# Patient Record
Sex: FEMALE | Race: WHITE
[De-identification: names, ages, dates, MRNs, and addresses within clinical notes are randomized per-mention and may not be internally consistent; named-entity substitution may affect disease eponyms.]

---

## 2017-06-17 ENCOUNTER — HOSPITAL ENCOUNTER (EMERGENCY)
Dept: HOSPITAL 62 - ER | Age: 37
LOS: 1 days | Discharge: HOME | End: 2017-06-18
Payer: OTHER GOVERNMENT

## 2017-06-17 DIAGNOSIS — S43.401A: ICD-10-CM

## 2017-06-17 DIAGNOSIS — R56.9: ICD-10-CM

## 2017-06-17 DIAGNOSIS — S02.5XXB: ICD-10-CM

## 2017-06-17 DIAGNOSIS — S09.90XA: ICD-10-CM
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
## 2017-06-17 DIAGNOSIS — S00.03XA: Primary | ICD-10-CM

## 2017-06-17 LAB
ALBUMIN SERPL-MCNC: 4.3 G/DL (ref 3.5–5)
ALP SERPL-CCNC: 81 U/L (ref 38–126)
ALT SERPL-CCNC: 31 U/L (ref 9–52)
ANION GAP SERPL CALC-SCNC: 12 MMOL/L (ref 5–19)
AST SERPL-CCNC: 26 U/L (ref 14–36)
BASOPHILS # BLD AUTO: 0.1 10^3/UL (ref 0–0.2)
BASOPHILS NFR BLD AUTO: 0.7 % (ref 0–2)
BILIRUB DIRECT SERPL-MCNC: 0.3 MG/DL (ref 0–0.4)
BILIRUB SERPL-MCNC: 0.4 MG/DL (ref 0.2–1.3)
BUN SERPL-MCNC: 16 MG/DL (ref 7–20)
CALCIUM: 8.8 MG/DL (ref 8.4–10.2)
CHLORIDE SERPL-SCNC: 103 MMOL/L (ref 98–107)
CO2 SERPL-SCNC: 25 MMOL/L (ref 22–30)
CREAT SERPL-MCNC: 0.86 MG/DL (ref 0.52–1.25)
EOSINOPHIL # BLD AUTO: 0.5 10^3/UL (ref 0–0.6)
EOSINOPHIL NFR BLD AUTO: 3 % (ref 0–6)
ERYTHROCYTE [DISTWIDTH] IN BLOOD BY AUTOMATED COUNT: 12.6 % (ref 11.5–14)
GLUCOSE SERPL-MCNC: 112 MG/DL (ref 75–110)
HCT VFR BLD CALC: 46.2 % (ref 36–47)
HGB BLD-MCNC: 14.9 G/DL (ref 12–15.5)
HGB HCT DIFFERENCE: -1.5
LYMPHOCYTES # BLD AUTO: 1.8 10^3/UL (ref 0.5–4.7)
LYMPHOCYTES NFR BLD AUTO: 11.2 % (ref 13–45)
MAGNESIUM SERPL-MCNC: 2.2 MG/DL (ref 1.6–2.3)
MCH RBC QN AUTO: 29 PG (ref 27–33.4)
MCHC RBC AUTO-ENTMCNC: 32.3 G/DL (ref 32–36)
MCV RBC AUTO: 90 FL (ref 80–97)
MONOCYTES # BLD AUTO: 0.7 10^3/UL (ref 0.1–1.4)
MONOCYTES NFR BLD AUTO: 4.3 % (ref 3–13)
NEUTROPHILS # BLD AUTO: 12.7 10^3/UL (ref 1.7–8.2)
NEUTS SEG NFR BLD AUTO: 80.8 % (ref 42–78)
POTASSIUM SERPL-SCNC: 4.1 MMOL/L (ref 3.6–5)
PROT SERPL-MCNC: 7.6 G/DL (ref 6.3–8.2)
RBC # BLD AUTO: 5.14 10^6/UL (ref 3.72–5.28)
SODIUM SERPL-SCNC: 140.1 MMOL/L (ref 137–145)
WBC # BLD AUTO: 15.7 10^3/UL (ref 4–10.5)

## 2017-06-17 PROCEDURE — 80307 DRUG TEST PRSMV CHEM ANLYZR: CPT

## 2017-06-17 PROCEDURE — 96375 TX/PRO/DX INJ NEW DRUG ADDON: CPT

## 2017-06-17 PROCEDURE — 87086 URINE CULTURE/COLONY COUNT: CPT

## 2017-06-17 PROCEDURE — 83735 ASSAY OF MAGNESIUM: CPT

## 2017-06-17 PROCEDURE — 81025 URINE PREGNANCY TEST: CPT

## 2017-06-17 PROCEDURE — 96374 THER/PROPH/DIAG INJ IV PUSH: CPT

## 2017-06-17 PROCEDURE — 36415 COLL VENOUS BLD VENIPUNCTURE: CPT

## 2017-06-17 PROCEDURE — 85025 COMPLETE CBC W/AUTO DIFF WBC: CPT

## 2017-06-17 PROCEDURE — 72125 CT NECK SPINE W/O DYE: CPT

## 2017-06-17 PROCEDURE — 80053 COMPREHEN METABOLIC PANEL: CPT

## 2017-06-17 PROCEDURE — 99285 EMERGENCY DEPT VISIT HI MDM: CPT

## 2017-06-17 PROCEDURE — 70450 CT HEAD/BRAIN W/O DYE: CPT

## 2017-06-17 PROCEDURE — 73030 X-RAY EXAM OF SHOULDER: CPT

## 2017-06-17 PROCEDURE — 81001 URINALYSIS AUTO W/SCOPE: CPT

## 2017-06-17 NOTE — RADIOLOGY REPORT (SQ)
EXAM DESCRIPTION:  SHOULDER RIGHT 2 OR MORE VIEWS



COMPLETED DATE/TIME:  6/17/2017 10:38 pm



REASON FOR STUDY:  fall with R lateral shoulder pain



COMPARISON:  None.



NUMBER OF VIEWS:  Three views.



TECHNIQUE:  Internal rotation, external rotation, and Y view images acquired of the right shoulder.



LIMITATIONS:  None.



FINDINGS:  MINERALIZATION: Normal.

BONES: No acute fracture or dislocation.  No worrisome bone lesions.

JOINTS: No dislocation.

VISUALIZED LUNGS AND RIBS: No pneumothorax.  No rib fracture.

SOFT TISSUES: No radiopaque foreign body.

OTHER: No other significant finding.



IMPRESSION:  NEGATIVE STUDY OF THE RIGHT SHOULDER. NO RADIOGRAPHIC EVIDENCE OF ACUTE INJURY.



TECHNICAL DOCUMENTATION:  JOB ID:  [id number]

 2011 [employer]- All Rights Reserved

## 2017-06-17 NOTE — ER DOCUMENT REPORT
ED General





- General


Chief Complaint: Head Injury


Stated Complaint: POSSIBLE SEIZURE


Time Seen by Provider: 06/17/17 22:04


Mode of Arrival: Medic


Information source: Patient





- HPI


Notes: 


Patient is a pleasant 37-year-old female history of seizures presents emergency 

department with report that she was at a local grocery store and had a 

witnessed seizure with tonic-clonic seizure activity and was postictal when EMS 

picked her up.  The patient chipped her left front tooth #9 and struck her 

right side of the head and presents with neck pain and right shoulder pain.  

She denies any medication noncompliance.  She does not drink alcohol.  She 

reports no sleep deprivation or other precipitants to this seizure event.


 


medications:  zoloft, zonegran, lamictal





Patient's last seizure was March 2014.  Patient does drive, and she was advised 

she could no longer drive until she was cleared by her neurologist.














Past Medical History





- General


Information source: Patient





- Social History


Smoking Status: Never Smoker


Smoking Education Provided: No


Frequency of alcohol use: None


Drug Abuse: None


Lives with: Family


Family History: Reviewed & Not Pertinent





Review of Systems





- Review of Systems


Notes: 


REVIEW OF SYSTEMS:


CONSTITUTIONAL :  Denies fever,  chills, or sweats.  Denies recent illness.


EENT:   Denies eye, ear, throat, or mouth pain or symptoms.  Denies nasal or 

sinus congestion or discharge.  Denies throat, tongue, or mouth swelling or 

difficulty swallowing.


CARDIOVASCULAR:  Denies chest pain.  Denies palpitations or racing or irregular 

heart beat.  Denies ankle edema.


RESPIRATORY:  Denies cough, cold, or chest congestion.  Denies shortness of 

breath, difficulty breathing, or wheezing.


GASTROINTESTINAL:  Denies abdominal pain or distention.  Denies vomiting, or 

diarrhea.  Denies blood in vomitus, stools, or per rectum.  Denies black, tarry 

stools.  Denies constipation.  patient reports mild nausea, which is typical 

after A seizure.


GENITOURINARY:  Denies difficulty urinating, painful urination, burning, 

frequency, blood in urine, or discharge.


FEMALE  GENITOURINARY:  Denies vaginal bleeding, heavy or abnormal periods, 

irregular periods.  Denies vaginal discharge or odor. 


MUSCULOSKELETAL:  Denies back  pain.  Pain or stiffness.  Reports right 

shoulder pain


SKIN:   Denies rash, lesions or sores.


HEMATOLOGIC :   Denies easy bruising or bleeding.


LYMPHATIC:  Denies swollen, enlarged glands.


NEUROLOGICAL:  Denies confusion or altered mental status.    Denies dizziness 

or lightheadedness.   Denies weakness or paralysis or loss of use of either 

side.  Denies problems with gait or speech.  Denies sensory loss, numbness, or 

tingling.  Patient reports right sided headache with contusion.  


PSYCHIATRIC:  Denies anxiety or stress.  Denies depression, suicidal ideation, 

or homicidal ideation.





ALL OTHER SYSTEMS REVIEWED AND NEGATIVE.








Dictation was performed using Dragon voice recognition software 





Physical Exam





- Notes


Notes: 


PHYSICAL EXAMINATION:





GENERAL: Well-appearing, well-nourished and in no acute distress.





HEAD: Right forehead/parietal contusion.  No bony deformity or crepitance.





EYES: Pupils equal round and reactive to light, extraocular movements intact, 

conjunctiva are normal.





ENT: Nares patent, oropharynx clear without exudates.  Moist mucous membranes.  

Patient has Thrasher class II dental fracture #9 tooth.  No alveolar ridge 

fracture identified.  Tooth is not significantly loose.  No other oral trauma 

noted.





NECK: supple without lymphadenopathy.  Patient has pain to the right lateral 

neck region.  No crepitance or bony deformity.  Trachea is midline.





LUNGS: Breath sounds clear to auscultation bilaterally and equal.  No wheezes 

rales or rhonchi.





HEART: Regular rate and rhythm without murmurs





ABDOMEN: Soft, nontender, nondistended abdomen.  No guarding, no rebound.  No 

masses appreciated.





Female : deferred





Musculoskeletal: Normal range of motion, no pitting or edema.  No cyanosis.  

Pain over the right lateral shoulder.  No significant pain over the right AC 

joint region.





NEUROLOGICAL: Cranial nerves grossly intact.  Normal speech, normal gait.  

Normal sensory, motor exams.  No cerebellar ataxia.  Patient is alert and 

oriented 3 on my exam.





PSYCH: Normal mood, normal affect.





SKIN: Warm, Dry, normal turgor, no rashes or lesions noted.





Course





- Re-evaluation


Re-evalutation: 


06/17/17 22:31


Patient was given Zofran en route by EMS.  Patient's blood sugar was 96.  After 

arrival, patient was given Tylenol, IV Toradol, IV Reglan for pain and nausea.





Patient had adequate relief of her discomfort after medications.  Patient was 

ambulatory without complaint.  Patient was given penicillin for dental fracture.





No evidence for acute intracranial injury, cervical spine fracture, obvious 

right shoulder fracture, electrolyte imbalance, hypoglycemia, anemia, urinary 

tract infection, pregnancy.





Patient was able to  raise her right arm up against gravity without significant 

difficulty prior to discharge.


06/18/17 00:59








- Laboratory


Result Diagrams: 


 06/17/17 22:47





 06/17/17 22:47


Laboratory results interpreted by me: 


 











  06/17/17 06/17/17 06/17/17





  22:47 22:47 23:45


 


WBC  15.7 H  


 


Seg Neutrophils %  80.8 H  


 


Lymphocytes %  11.2 L  


 


Absolute Neutrophils  12.7 H  


 


Glucose   112 H 


 


Ur Leukocyte Esterase    TRACE H














Discharge





- Discharge


Clinical Impression: 


 Seizure, Thrasher type II





Contusion


Qualifiers:


 Encounter type: initial encounter Contusion area: head Contusion of head detail

: scalp Qualified Code(s): S00.03XA - Contusion of scalp, initial encounter





Head injury


Qualifiers:


 Encounter type: initial encounter Qualified Code(s): S09.90XA - Unspecified 

injury of head, initial encounter





Tooth fracture


Qualifiers:


 Encounter type: initial encounter Fracture type: open Qualified Code(s): 

S02.5XXB - Fracture of tooth (traumatic), initial encounter for open fracture





Shoulder sprain


Qualifiers:


 Encounter type: initial encounter Shoulder sprain type: unspecified sprain 

Laterality: right Qualified Code(s): S43.401A - Unspecified sprain of right 

shoulder joint, initial encounter





Condition: Stable


Disposition: HOME, SELF-CARE


Instructions:  Seizure, Known Epileptic ([facility]), Head Injury Precautions ([facility]), 

Contusion ([facility]), Shoulder Injury ([facility])


Additional Instructions: 


No driving or swimming or operating heavy machinery until you get appropriate 

follow-up and clearance by neurology.





Follow-up with local dentist related to the tooth fracture.





Take ibuprofen as directed for pain.


Prescriptions: 


Amoxicillin 1 tab PO TID #15 tab


Forms:  Return to Work

## 2017-06-17 NOTE — RADIOLOGY REPORT (SQ)
EXAM DESCRIPTION:  CT CERVICAL SPINE WITHOUT



COMPLETED DATE/TIME:  6/17/2017 9:46 pm



REASON FOR STUDY:  INJURY



COMPARISON:  None.



TECHNIQUE:  Axial images acquired through the cervical spine without intravenous contrast.  Images re
viewed with lung, soft tissue and bone windows.  Reconstructed coronal and sagittal MPR images review
ed.  Images stored on PACS.

All CT scanners at this facility use dose modulation, iterative reconstruction, and/or weight based d
osing when appropriate to reduce radiation dose to as low as reasonably achievable (ALARA).

[facility]: Dose Right  [facility]: CareDose    [facility]: Dose Right    [facility]: Teradose 4D    [facility]: Smart [employer]



RADIATION DOSE:  Up-to-date CT equipment and radiation dose reduction techniques were employed. CTDIv
ol: 20.5 mGy. DLP: 412 mGy-cm. mGy.



LIMITATIONS:  None.



FINDINGS:  ALIGNMENT: Anatomic.

MINERALIZATION: Normal.

VERTEBRAL BODIES: No fractures or dislocation.

DISCS: No significant disc disease.

FACETS, LATERAL MASSES, POSTERIOR ELEMENTS: No fractures.  No dislocation.  No acute findings.

HARDWARE: None in the spine.

VISUALIZED RIBS: No fractures.

LUNG APICES AND SOFT TISSUES: No significant or acute findings.

OTHER: No other significant finding.



IMPRESSION:  NO ACUTE OR SIGNIFICANT FINDINGS IN THE CERVICAL SPINE.



TECHNICAL DOCUMENTATION:  JOB ID:  [id number]

Quality ID # 436: Final reports with documentation of one or more dose reduction techniques (e.g., Au
tomated exposure control, adjustment of the mA and/or kV according to patient size, use of iterative 
reconstruction technique)

 2011 [employer]- All Rights Reserved

## 2017-06-17 NOTE — RADIOLOGY REPORT (SQ)
EXAM DESCRIPTION:  CT HEAD WITHOUT



COMPLETED DATE/TIME:  6/17/2017 9:40 pm



REASON FOR STUDY:  head injury, seizure



COMPARISON:  None.



TECHNIQUE:  Axial images acquired through the brain without intravenous contrast.  Images reviewed wi
th bone, brain and subdural windows.  Images stored on PACS.

All CT scanners at this facility use dose modulation, iterative reconstruction, and/or weight based d
osing when appropriate to reduce radiation dose to as low as reasonably achievable (ALARA).

[facility]: Dose Right  [facility]: CareDose    [facility]: Dose Right    [facility]: Teradose 4D    [facility]: [employer]



RADIATION DOSE:  Up-to-date CT equipment and radiation dose reduction techniques were employed. CTDIv
ol: 64.6 mGy. DLP: 1163 mGy-cm. mGy.



LIMITATIONS:  None.



FINDINGS:  VENTRICLES: Normal size and contour.

CEREBRUM: No masses.  No hemorrhage.  No midline shift.  Normal gray/white matter differentiation.  N
o evidence for acute infarction.

CEREBELLUM: No masses.  No hemorrhage.  No alteration of density.  No evidence for acute infarction.

EXTRAAXIAL SPACES: No fluid collections.  No masses.

ORBITS AND GLOBE: No intra- or extraconal masses.  Normal contour of globe without masses.

CALVARIUM: No fracture.

PARANASAL SINUSES: The left maxillary sinus appears diminutive and near complete opacification.  The 
remaining paranasal sinuses are clear and evenly aerated.  The right mastoid air cells are clear scle
rotic.

SOFT TISSUES: No mass or hematoma.

OTHER: No other significant finding.



IMPRESSION:  NORMAL BRAIN CT WITHOUT CONTRAST.  INCIDENTAL NOTE IS MADE OF CHRONIC LEFT MAXILLARY SIN
USITIS.



TECHNICAL DOCUMENTATION:  JOB ID:  [id number]

Quality ID # 436: Final reports with documentation of one or more dose reduction techniques (e.g., Au
tomated exposure control, adjustment of the mA and/or kV according to patient size, use of iterative 
reconstruction technique)

 2011 [employer]- All Rights Reserved

## 2017-06-18 VITALS — SYSTOLIC BLOOD PRESSURE: 120 MMHG | DIASTOLIC BLOOD PRESSURE: 84 MMHG

## 2017-06-18 LAB
APPEARANCE UR: (no result)
BARBITURATES UR QL SCN: NEGATIVE
BILIRUB UR QL STRIP: NEGATIVE
GLUCOSE UR STRIP-MCNC: NEGATIVE MG/DL
KETONES UR STRIP-MCNC: NEGATIVE MG/DL
METHADONE UR QL SCN: NEGATIVE
NITRITE UR QL STRIP: NEGATIVE
PCP UR QL SCN: NEGATIVE
PH UR STRIP: 7 [PH] (ref 5–9)
PROT UR STRIP-MCNC: NEGATIVE MG/DL
SP GR UR STRIP: 1.02
URINE OPIATES LOW: NEGATIVE
UROBILINOGEN UR-MCNC: NEGATIVE MG/DL (ref ?–2)

## 2018-03-20 ENCOUNTER — HOSPITAL ENCOUNTER (OUTPATIENT)
Dept: HOSPITAL 62 - NEURO | Age: 38
End: 2018-03-20
Attending: PSYCHIATRY & NEUROLOGY
Payer: OTHER GOVERNMENT

## 2018-03-20 DIAGNOSIS — G40.909: Primary | ICD-10-CM

## 2018-03-20 PROCEDURE — 95819 EEG AWAKE AND ASLEEP: CPT

## 2018-03-22 NOTE — EEG PRO FEE REPORT
EEG INTERPRETATION



PATIENT NAME: YURI SANCHEZ

MRN: [id number]      ACCT #:  [id number]   ROOM#: 

ORDER#: [id number]

DATE OF STUDY:  2018                   : 1980

REFERRING MD:  KARISHMA MEYERS M.D.

DIAGNOSIS:  Epilepsy

 





REPORT

The background activity consists of 8-9 Hz medium voltage alpha.  During

hyperventilation there is fairly marked build up with the record returning

to normal after one and half minutes.  No clear focal slowing, amplitude

asymmetry, or epileptiform discharges are noted some motion artifact is

seen.





IMPRESSION

Normal EEG









INTERPRETING PHYSICIAN: JANICE MILLS M.D.





/:  MTEFFT     DT:  2018 TT:  0917      ID:  [id number]

/:  [id number]      DD:  2018 TD:  1419     JOB:  [id number]



cc:JENNIFER BELLAMY M.D.

>